# Patient Record
Sex: MALE | Race: WHITE | NOT HISPANIC OR LATINO | Employment: OTHER | URBAN - METROPOLITAN AREA
[De-identification: names, ages, dates, MRNs, and addresses within clinical notes are randomized per-mention and may not be internally consistent; named-entity substitution may affect disease eponyms.]

---

## 2019-04-08 ENCOUNTER — HOSPITAL ENCOUNTER (EMERGENCY)
Facility: HOSPITAL | Age: 49
Discharge: HOME | End: 2019-04-09
Attending: EMERGENCY MEDICINE
Payer: MEDICAID

## 2019-04-08 DIAGNOSIS — Z71.6 TOBACCO ABUSE COUNSELING: ICD-10-CM

## 2019-04-08 DIAGNOSIS — R03.0 ELEVATED BLOOD PRESSURE READING: ICD-10-CM

## 2019-04-08 DIAGNOSIS — L03.011 CELLULITIS OF RIGHT FINGER: Primary | ICD-10-CM

## 2019-04-08 PROCEDURE — 99284 EMERGENCY DEPT VISIT MOD MDM: CPT

## 2019-04-08 PROCEDURE — 25000003 PHARM REV CODE 250: Performed by: NURSE PRACTITIONER

## 2019-04-08 RX ORDER — CEPHALEXIN 500 MG/1
500 CAPSULE ORAL
Status: COMPLETED | OUTPATIENT
Start: 2019-04-08 | End: 2019-04-08

## 2019-04-08 RX ORDER — TRAMADOL HYDROCHLORIDE 50 MG/1
50 TABLET ORAL
Status: COMPLETED | OUTPATIENT
Start: 2019-04-08 | End: 2019-04-08

## 2019-04-08 RX ADMIN — TRAMADOL HYDROCHLORIDE 50 MG: 50 TABLET, FILM COATED ORAL at 11:04

## 2019-04-08 RX ADMIN — CEPHALEXIN 500 MG: 500 CAPSULE ORAL at 11:04

## 2019-04-09 VITALS
BODY MASS INDEX: 32.27 KG/M2 | DIASTOLIC BLOOD PRESSURE: 93 MMHG | SYSTOLIC BLOOD PRESSURE: 149 MMHG | OXYGEN SATURATION: 97 % | TEMPERATURE: 98 F | HEIGHT: 75 IN | WEIGHT: 259.5 LBS | HEART RATE: 89 BPM | RESPIRATION RATE: 16 BRPM

## 2019-04-09 RX ORDER — DICLOFENAC SODIUM 50 MG/1
50 TABLET, DELAYED RELEASE ORAL 3 TIMES DAILY PRN
Qty: 15 TABLET | Refills: 0 | Status: SHIPPED | OUTPATIENT
Start: 2019-04-08

## 2019-04-09 RX ORDER — CEPHALEXIN 500 MG/1
500 CAPSULE ORAL 4 TIMES DAILY
Qty: 28 CAPSULE | Refills: 0 | Status: SHIPPED | OUTPATIENT
Start: 2019-04-09 | End: 2019-04-16

## 2019-04-10 NOTE — ED PROVIDER NOTES
HISTORY     Chief Complaint   Patient presents with    Hand Pain     pt reports he got a piece of concrete in his finger in December and has had pain ever since     Review of patient's allergies indicates:  No Known Allergies     HPI   The history is provided by the patient.   Hand Injury    Illness onset: 4 months ago. The incident occurred at home. Injury mechanism: possible foreign body  Pain location: 4th finger right hand  The quality of the pain is described as throbbing. The pain has been fluctuating since the incident. Pertinent negatives include no fever and no malaise/fatigue. It is unknown if a foreign body is present. The symptoms are aggravated by movement, use and palpation. He has tried nothing for the symptoms.      Patient states he has been having a wound on finger since December that he takes his knife and cuts the scab off often. Patient that's there must have been a piece of gravel in the wound. Patient states tonight when he cut the wound pus was expressed out.   PCP: [facility]     Past Medical History:  Past Medical History:   Diagnosis Date    Arthritis     Hypertension     MI (myocardial infarction)         Past Surgical History:  Past Surgical History:   Procedure Laterality Date    ANKLE SURGERY      CORONARY STENT PLACEMENT      TONSILLECTOMY          Family History:  History reviewed. No pertinent family history.     Social History:  Social History     Tobacco Use    Smoking status: Current Every Day Smoker     Types: Cigarettes    Smokeless tobacco: Never Used   Substance and Sexual Activity    Alcohol use: Yes    Drug use: No    Sexual activity: Not on file         ROS   Review of Systems   Constitutional: Negative for fever and malaise/fatigue.   HENT: Negative for sore throat.    Respiratory: Negative for shortness of breath.    Cardiovascular: Negative for chest pain.   Gastrointestinal: Negative for nausea.   Genitourinary: Negative for dysuria.  "  Musculoskeletal: Negative for back pain.        Finger pain 4th right    Skin: Negative for rash.   Neurological: Negative for weakness.   Hematological: Does not bruise/bleed easily.       PHYSICAL EXAM     Initial Vitals [04/08/19 2235]   BP Pulse Resp Temp SpO2   (!) 154/100 97 16 98.4 °F (36.9 °C) 97 %      MAP       --           Physical Exam    Constitutional: He appears well-developed and well-nourished. No distress.   HENT:   Head: Normocephalic and atraumatic.   Eyes: Conjunctivae are normal. Pupils are equal, round, and reactive to light.   Neck: Normal range of motion. Neck supple.   Cardiovascular: Normal rate, regular rhythm and normal heart sounds.   Pulmonary/Chest: Breath sounds normal.   Abdominal: Soft. Bowel sounds are normal.   Musculoskeletal: Normal range of motion.        Hands:  Neurological: He is alert and oriented to person, place, and time. No cranial nerve deficit.   Skin: Skin is warm and dry.   Psychiatric: He has a normal mood and affect.          ED COURSE   Procedures  ED ONGOING VITALS:  Vitals:    04/08/19 2235 04/09/19 0007   BP: (!) 154/100 (!) 149/93   Pulse: 97 89   Resp: 16 16   Temp: 98.4 °F (36.9 °C) 98.4 °F (36.9 °C)   TempSrc: Oral Oral   SpO2: 97% 97%   Weight: 117.7 kg (259 lb 7.7 oz)    Height: 6' 3" (1.905 m)          ABNORMAL LAB VALUES:  Labs Reviewed - No data to display      ALL LAB VALUES:        RADIOLOGY STUDIES:  Imaging Results          X-Ray Finger 2 or More Views Right (Final result)  Result time 04/08/19 23:54:23   Procedure changed from X-Ray Finger 2 or More Views Left     Final result by Skyler Weeks MD (04/08/19 23:54:23)                 Impression:      Negative exam.      Electronically signed by: Skyler Weeks MD  Date:    04/08/2019  Time:    23:54             Narrative:    EXAMINATION:  XR FINGER 2 OR MORE VIEWS RIGHT    CLINICAL HISTORY:  finger injury;    TECHNIQUE:  Standard radiography performed.    COMPARISON:  None    FINDINGS:  Bone " density and architecture are normal.  No acute findings.                                          The above vital signs and test results have been reviewed by the emergency provider.     ED Medications:  Discharge Medication List as of 4/9/2019 12:00 AM      START taking these medications    Details   cephALEXin (KEFLEX) 500 MG capsule Take 1 capsule (500 mg total) by mouth 4 (four) times daily. for 7 days, Starting Tue 4/9/2019, Until Tue 4/16/2019, Print      diclofenac (VOLTAREN) 50 MG EC tablet Take 1 tablet (50 mg total) by mouth 3 (three) times daily as needed., Starting Mon 4/8/2019, Print           Discharge Medications:  Discharge Medication List as of 4/9/2019 12:00 AM      START taking these medications    Details   cephALEXin (KEFLEX) 500 MG capsule Take 1 capsule (500 mg total) by mouth 4 (four) times daily. for 7 days, Starting Tue 4/9/2019, Until Tue 4/16/2019, Print      diclofenac (VOLTAREN) 50 MG EC tablet Take 1 tablet (50 mg total) by mouth 3 (three) times daily as needed., Starting Mon 4/8/2019, Print            Follow-up Information     [facility]    Specialty:  Emergency Medicine  Why:  As needed, If symptoms worsen  Contact information:  [id number] [facility] [id number] [phone]           Schedule an appointment as soon as possible for a visit  with PCp/ortho.               Patient to see personal ortho for f/u     I discussed with patient and/or family/caretaker that evaluation in the ED does not suggest any emergent or life threatening medical conditions requiring immediate intervention beyond what was provided in the ED, and I believe patient is safe for discharge. Regardless, an unremarkable evaluation in the ED does not preclude the development or presence of a serious or life threatening condition. As such, patient was instructed to return immediately for any worsening or change in current symptoms.    Pre-hypertension/Hypertension: The  pt has been informed that they may have pre-hypertension or hypertension based on a blood pressure reading in the ED. I recommend that the pt call the PCP listed on their discharge instructions or a physician of their choice this week to arrange f/u for further evaluation of possible pre-hypertension or hypertension.       MEDICAL DECISION MAKING                 CLINICAL IMPRESSION       ICD-10-CM ICD-9-CM   1. Cellulitis of right finger L03.011 681.00   2. Tobacco abuse counseling Z71.6 V65.42     305.1   3. Elevated blood pressure reading R03.0 796.2       Disposition:   Disposition: Discharged  Condition: Stable         Rogelio Ribeiro NP  04/10/19 1719